# Patient Record
Sex: FEMALE | Race: WHITE | Employment: OTHER | URBAN - NONMETROPOLITAN AREA
[De-identification: names, ages, dates, MRNs, and addresses within clinical notes are randomized per-mention and may not be internally consistent; named-entity substitution may affect disease eponyms.]

---

## 2019-09-01 ENCOUNTER — OFFICE VISIT (OUTPATIENT)
Dept: FAMILY MEDICINE | Facility: OTHER | Age: 59
End: 2019-09-01
Attending: NURSE PRACTITIONER
Payer: COMMERCIAL

## 2019-09-01 VITALS
SYSTOLIC BLOOD PRESSURE: 128 MMHG | BODY MASS INDEX: 33.85 KG/M2 | WEIGHT: 203.2 LBS | OXYGEN SATURATION: 94 % | TEMPERATURE: 97.9 F | DIASTOLIC BLOOD PRESSURE: 88 MMHG | HEIGHT: 65 IN | HEART RATE: 75 BPM | RESPIRATION RATE: 16 BRPM
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
## 2019-09-01 PROCEDURE — G0463 HOSPITAL OUTPT CLINIC VISIT: HCPCS

## 2019-09-01 PROCEDURE — G0463 HOSPITAL OUTPT CLINIC VISIT: HCPCS | Mod: 25

## 2019-09-01 PROCEDURE — 25000128 H RX IP 250 OP 636: Performed by: NURSE PRACTITIONER

## 2019-09-01 PROCEDURE — 90471 IMMUNIZATION ADMIN: CPT

## 2019-09-01 PROCEDURE — 90675 RABIES VACCINE IM: CPT

## 2019-09-01 PROCEDURE — 99203 OFFICE O/P NEW LOW 30 MIN: CPT | Performed by: NURSE PRACTITIONER

## 2019-09-01 PROCEDURE — 90715 TDAP VACCINE 7 YRS/> IM: CPT

## 2019-09-01 PROCEDURE — 96372 THER/PROPH/DIAG INJ SC/IM: CPT

## 2019-09-01 PROCEDURE — 90375 RABIES IG IM/SC: CPT | Performed by: NURSE PRACTITIONER

## 2019-09-01 PROCEDURE — 90472 IMMUNIZATION ADMIN EACH ADD: CPT

## 2019-09-01 RX ORDER — MELATONIN 10 MG
CAPSULE ORAL
COMMUNITY

## 2019-09-01 RX ORDER — SIMVASTATIN 20 MG
TABLET ORAL
Refills: 11 | COMMUNITY
Start: 2019-08-27

## 2019-09-01 RX ORDER — LEVOTHYROXINE SODIUM 175 UG/1
TABLET ORAL
Refills: 11 | COMMUNITY
Start: 2019-08-27

## 2019-09-01 RX ADMIN — RABIES IMMUNE GLOBULIN (HUMAN) 1830 UNITS: 300 INJECTION, SOLUTION INFILTRATION; INTRAMUSCULAR at 13:09

## 2019-09-01 ASSESSMENT — PAIN SCALES - GENERAL: PAINLEVEL: NO PAIN (0)

## 2019-09-01 ASSESSMENT — MIFFLIN-ST. JEOR: SCORE: 1497.59

## 2019-09-01 NOTE — PATIENT INSTRUCTIONS
Patient Education     Understanding Rabies  Rabies is a virus that infects the nerves and the brain. Any warm-blooded animal, including humans, can get it. It is spread in the saliva of an infected animal. Rabies can be treated. Untreated rabies is almost always fatal.  What causes of rabies?  Infections occur when saliva or brain tissue from an infected animal enters another mammal s body. This can be through a bite or scrape that breaks the skin. Rarely, infection can be spread through a mucous membrane, such as the eye or mouth.  In [place], raccoons, bats, foxes, and skunks are most likely to carry the rabies virus. Dogs, cats, and ferrets can also get rabies. Many people have their pets vaccinated. Because of this, pets do not pose a high risk for rabies in this region. This is not the case in other parts of the world.  What are the symptoms of rabies?  In most cases, symptoms of rabies start 1 to 3 months after exposure.  But in some cases, they may not show up for more than a year. First symptoms may include:    Feeling unwell    Weakness    Fatigue    Low-grade fever    Nausea or vomiting    Headache  Over many days to a few weeks more symptoms may develop. These include tingling, itching, numbness, or a burning sensation where the bite occurred.  It is very important to talk with your healthcare provider right away if you think you have been exposed to rabies. Once these symptoms develop, the infection may be far advanced.  The end stages of rabies are almost always fatal. Symptoms at that point include restlessness and confusion, drooling, pain when trying to drink, muscle weakness, numbness, and paralysis.  What to do    If you have been bitten or scratched by a wild animal, contact your healthcare provider right away.    If you have been bitten or scratched by a pet, and you do not know if it has had a rabies vaccine, call your local animal control department or your local public health department.  The animal will probably be confined with its owner for 10 days. If the animal does not develop rabies in that time, you are not at risk of getting sick.  How is rabies treated?  Treatments focus on reducing the risk of developing rabies after a bite or scratch. They include:    Cleaning the wound thoroughly. The risk of infection can be reduced by washing the bite or scratch with soap and water, or water and iodine.    Getting a tetanus vaccine, if you have not had one in 10 years or more. This is to prevent infection with tetanus. This is a bacterium that can cause another serious illness known as lockjaw.    Getting a series of shots to prevent rabies infection. These are usually given in the arm, like a flu shot. They include the rabies vaccine and the human rabies antibody to help your body fight the virus. This treatment can work even after rabies has entered the body, but not after symptoms begin.  What are the possible complications of rabies?  Rabies infections that are not treated almost always result in death. By the time symptoms of rabies appear, it is usually too late for treatment to work. If you think you have been exposed to rabies, it is very important to talk with your healthcare provider right away. He or she will tell you whether you should be treated.  How can I prevent rabies?   If you don t take unnecessary risks, you don t need to be afraid of getting rabies. To avoid exposure:    Don t handle wild animals or stray dogs or cats. Report strays and ill animals to animal control.    Vaccinate your pets. Urge your neighbors to do the same.    Try to limit your pets  exposure to wild animals.    If you are traveling to other countries, ask your healthcare provider or a travel clinic about vaccines you should have.  When should I call my healthcare provider?  Call your healthcare provider right away if you have any of these:    A bite or scratch from a wild animal that has broken the skin. This  might be from a raccoon or skunk.    A bite or scratch from a pet animal that has broken the skin, and you cannot be sure that it has had a current rabies vaccine.    You or your child may have been scratched or bitten by a bat. Bites and scratches from bats may not be noticed, especially by children.   Date Last Reviewed: 3/28/2016    8031-1303 [employer]. [address] [place] [address], [place] [address]. All rights reserved. This information is not intended as a substitute for professional medical care. Always follow your healthcare professional's instructions.         Rabies Vaccine - Please return on 9/4/19 for 2nd, 9/8/19 for your 3rd dose and 9/29/2019 for your final dose.

## 2019-09-01 NOTE — NURSING NOTE
"Chief Complaint   Patient presents with     Animal Bite     Pt states she noticed bite marks on her right foot on tuesday. Thinks she was bit by a bat on 8/22.       Initial /88 (BP Location: Right arm, Patient Position: Sitting, Cuff Size: Adult Regular)   Pulse 75   Temp 97.9  F (36.6  C) (Tympanic)   Resp 16   Ht 1.651 m (5' 5\")   Wt 92.2 kg (203 lb 3.2 oz)   SpO2 94%   Breastfeeding? No   BMI 33.81 kg/m   Estimated body mass index is 33.81 kg/m  as calculated from the following:    Height as of this encounter: 1.651 m (5' 5\").    Weight as of this encounter: 92.2 kg (203 lb 3.2 oz).  Medication Reconciliation: complete    Serenity No LPN on 9/1/2019 at 11:42 AM    "

## 2019-09-01 NOTE — PROGRESS NOTES
"Nursing Notes:   Serenity No LPN  9/1/2019 11:42 AM  Sign at exiting of workspace  Chief Complaint   Patient presents with     Animal Bite     Pt states she noticed bite marks on her right foot on tuesday. Thinks she was bit by a bat on 8/22.       Initial /88 (BP Location: Right arm, Patient Position: Sitting, Cuff Size: Adult Regular)   Pulse 75   Temp 97.9  F (36.6  C) (Tympanic)   Resp 16   Ht 1.651 m (5' 5\")   Wt 92.2 kg (203 lb 3.2 oz)   SpO2 94%   Breastfeeding? No   BMI 33.81 kg/m    Estimated body mass index is 33.81 kg/m  as calculated from the following:    Height as of this encounter: 1.651 m (5' 5\").    Weight as of this encounter: 92.2 kg (203 lb 3.2 oz).  Medication Reconciliation: complete    Serenity No LPN on 9/1/2019 at 11:42 AM      SUBJECTIVE:   Lesly Black is a 58 year old female who presents to clinic today for the following health issues:    [facility] a wound to her right foot.  She reports she had a nightly occurrence where bats were flying around her room for about a week.  The last known bat exposure was August 22.  On this particular night there was one flying around the room that she was awaken to, however then she felt a sharp pinch on her right foot.  On Tuesday, August 27 she noted potential bat bite to her right great MCP joint.  She comes in today for evaluation and treatment.  There have been no fevers, chills, signs or symptoms of systemic illness.  No nausea, vomiting, confusion or other neurological symptoms.      Problem list and histories reviewed & adjusted, as indicated.  Additional history: as documented    There is no problem list on file for this patient.    History reviewed. No pertinent surgical history.    Social History     Tobacco Use     Smoking status: Never Smoker     Smokeless tobacco: Never Used   Substance Use Topics     Alcohol use: Yes     History reviewed. No pertinent family history.      Current " "Outpatient Medications   Medication Sig Dispense Refill     levothyroxine (SYNTHROID/LEVOTHROID) 175 MCG tablet   11     Melatonin 10 MG CAPS        simvastatin (ZOCOR) 20 MG tablet   11     Allergies   Allergen Reactions     Latex          ROS:  Notable findings in the HPI.       OBJECTIVE:     /88 (BP Location: Right arm, Patient Position: Sitting, Cuff Size: Adult Regular)   Pulse 75   Temp 97.9  F (36.6  C) (Tympanic)   Resp 16   Ht 1.651 m (5' 5\")   Wt 92.2 kg (203 lb 3.2 oz)   SpO2 94%   Breastfeeding? No   BMI 33.81 kg/m    Body mass index is 33.81 kg/m .  GENERAL: healthy, alert and no distress  EYES: Eyes grossly normal to inspection  RESP: Without increased work of breathing  CV: regular rates and rhythm and no peripheral edema  SKIN: Right foot: Just over the first MCP joint are to puncture wounds, consistent with a bite of some kind.  See picture for detail          Diagnostic Test Results:  none     ASSESSMENT/PLAN:     1. Bat bite wound  - rabies immune globulin 300 units/mL (HYPERAB) injection 1,830 Units  - [facility] IMM-  RABIES VACCINE, IM (IMOVAX)  - [facility] IMM-  TDAP VACCINE (BOOSTRIX )    Clinic course: Evaluation was done.  Picture was taken.  [place] [facility] was called to verify if immunoglobulin would still be effective at this point since it has been 10 days.  Spoke with  who recommended immunoglobulin and rabies vaccination still be provided to the patient.  The patient was okay with this and injections were provided.    PLAN:    Patient with a 10-day old bat bite.  [place] [facility] recommends rabies vaccination and immunoglobulin be given.  Process carried out today.  Did give her the rabies schedule which would be second dose of the vaccine on 9/4, third dose on 9/8, and last dose being 9/29.  She will get these last vaccinations at her primary care clinic.  Some of these will be given on a Sunday.  In the past I have sent prescriptions to " [facility] or a pharmacy.  And have the pharmacist give the injections over the weekend.  She is okay with this but will follow advisement of her primary care provider    Followup:    If not improving or if condition worsens, follow up with your Primary Care Provider    I explained my diagnostic considerations and recommendations to the patient, who voiced understanding and agreement with the treatment plan. All questions were answered. We discussed potential side effects of any prescribed or recommended therapies, as well as expectations for response to treatments.  She was advised to contact our office if there is no improvement or worsening of conditions or symptoms.  If s/s worsen or persist, patient will either come back or follow up with PCP.    Disclaimer:  This note consists of words and symbols derived from keyboarding, dictation, or using voice recognition software. As a result, there may be errors in the script that have gone undetected. Please consider this when interpreting information found in this note.      Keke Dale NP, 9/1/2019 11:50 AM